# Patient Record
Sex: MALE | Race: WHITE | HISPANIC OR LATINO | Employment: UNEMPLOYED | URBAN - METROPOLITAN AREA
[De-identification: names, ages, dates, MRNs, and addresses within clinical notes are randomized per-mention and may not be internally consistent; named-entity substitution may affect disease eponyms.]

---

## 2023-04-15 ENCOUNTER — HOSPITAL ENCOUNTER (EMERGENCY)
Facility: CLINIC | Age: 33
Discharge: HOME | End: 2023-04-15
Attending: EMERGENCY MEDICINE | Admitting: EMERGENCY MEDICINE

## 2023-04-15 VITALS
RESPIRATION RATE: 20 BRPM | HEART RATE: 81 BPM | TEMPERATURE: 98.2 F | DIASTOLIC BLOOD PRESSURE: 78 MMHG | SYSTOLIC BLOOD PRESSURE: 132 MMHG | OXYGEN SATURATION: 98 %

## 2023-04-15 DIAGNOSIS — R42 DIZZINESS: ICD-10-CM

## 2023-04-15 LAB
ANION GAP SERPL CALCULATED.3IONS-SCNC: 11 MMOL/L (ref 7–15)
BASOPHILS # BLD AUTO: 0 10E3/UL (ref 0–0.2)
BASOPHILS NFR BLD AUTO: 0 %
BUN SERPL-MCNC: 13.3 MG/DL (ref 6–20)
CALCIUM SERPL-MCNC: 9.2 MG/DL (ref 8.6–10)
CHLORIDE SERPL-SCNC: 101 MMOL/L (ref 98–107)
CREAT SERPL-MCNC: 0.92 MG/DL (ref 0.67–1.17)
DEPRECATED HCO3 PLAS-SCNC: 26 MMOL/L (ref 22–29)
EOSINOPHIL # BLD AUTO: 0 10E3/UL (ref 0–0.7)
EOSINOPHIL NFR BLD AUTO: 1 %
ERYTHROCYTE [DISTWIDTH] IN BLOOD BY AUTOMATED COUNT: 12.9 % (ref 10–15)
GFR SERPL CREATININE-BSD FRML MDRD: >90 ML/MIN/1.73M2
GLUCOSE SERPL-MCNC: 117 MG/DL (ref 70–99)
HCT VFR BLD AUTO: 41.4 % (ref 40–53)
HGB BLD-MCNC: 14.1 G/DL (ref 13.3–17.7)
IMM GRANULOCYTES # BLD: 0 10E3/UL
IMM GRANULOCYTES NFR BLD: 1 %
LYMPHOCYTES # BLD AUTO: 1.9 10E3/UL (ref 0.8–5.3)
LYMPHOCYTES NFR BLD AUTO: 29 %
MCH RBC QN AUTO: 29 PG (ref 26.5–33)
MCHC RBC AUTO-ENTMCNC: 34.1 G/DL (ref 31.5–36.5)
MCV RBC AUTO: 85 FL (ref 78–100)
MONOCYTES # BLD AUTO: 0.4 10E3/UL (ref 0–1.3)
MONOCYTES NFR BLD AUTO: 7 %
NEUTROPHILS # BLD AUTO: 4.2 10E3/UL (ref 1.6–8.3)
NEUTROPHILS NFR BLD AUTO: 62 %
NRBC # BLD AUTO: 0 10E3/UL
NRBC BLD AUTO-RTO: 0 /100
PLATELET # BLD AUTO: 258 10E3/UL (ref 150–450)
POTASSIUM SERPL-SCNC: 3.7 MMOL/L (ref 3.4–5.3)
RBC # BLD AUTO: 4.86 10E6/UL (ref 4.4–5.9)
SODIUM SERPL-SCNC: 138 MMOL/L (ref 136–145)
TROPONIN T SERPL HS-MCNC: <6 NG/L
WBC # BLD AUTO: 6.6 10E3/UL (ref 4–11)

## 2023-04-15 PROCEDURE — 36415 COLL VENOUS BLD VENIPUNCTURE: CPT | Performed by: EMERGENCY MEDICINE

## 2023-04-15 PROCEDURE — 85014 HEMATOCRIT: CPT | Performed by: EMERGENCY MEDICINE

## 2023-04-15 PROCEDURE — 84484 ASSAY OF TROPONIN QUANT: CPT | Performed by: EMERGENCY MEDICINE

## 2023-04-15 PROCEDURE — 99284 EMERGENCY DEPT VISIT MOD MDM: CPT

## 2023-04-15 PROCEDURE — 93005 ELECTROCARDIOGRAM TRACING: CPT

## 2023-04-15 PROCEDURE — 80048 BASIC METABOLIC PNL TOTAL CA: CPT | Performed by: EMERGENCY MEDICINE

## 2023-04-15 ASSESSMENT — ACTIVITIES OF DAILY LIVING (ADL): ADLS_ACUITY_SCORE: 33

## 2023-04-15 ASSESSMENT — ENCOUNTER SYMPTOMS: DIZZINESS: 1

## 2023-04-15 NOTE — ED TRIAGE NOTES
While at work patient felt dizzy, vision became blurry, and felt like he was going to pass out. This occurred about 1415 when he was carrying heavy supplies to build a wall. Patient still feels dizzy with blurred vision. Was able to eat today prior. Had an episode 2 days ago was wasn't as intense and resolved quickly.

## 2023-04-15 NOTE — ED PROVIDER NOTES
History     Chief Complaint:  Dizziness       The history is provided by the patient. A  was used ([place]).      Donovan Martinez is a 32 year old male who presents with dizziness. The patient was at work around 1415 today when he began to feel like he was going to pass out with dizziness and blurred vision. The patient does work construction and carries heavy supplies and has been working a lot lately, including this past week with all the hotter weather. He has had 2 of these episodes and one at home when he was laying down watching a movie which lasted for not even a minute. He mentions that he was told that he previously was told he was having problems with his kidneys and only drinks one coffee a day.     Independent Historian:   None - Patient Only    Review of External Notes: none      ROS:  Review of Systems   Eyes: Positive for visual disturbance.   Neurological: Positive for dizziness.   All other systems reviewed and are negative.    Allergies:  The patient has no known allergies      Medications:    The patient is not taking any routine medications     Past Medical History:    The patient denies any past medical history     Social History:  Patient came from work.  Patient is unaccompanied in the ED.     PCP: No Ref-Primary, Physician     Physical Exam     Patient Vitals for the past 24 hrs:   BP Temp Pulse Resp SpO2   04/15/23 1518 (!) 147/86 98.2  F (36.8  C) 86 20 100 %        Physical Exam  Nursing note and vitals reviewed.  HENT:   Mouth/Throat: Moist mucous membranes.   Eyes: EOMI, nonicteric sclera  Cardiovascular: Normal rate, regular rhythm, no murmurs, rubs, or gallops  Pulmonary/Chest: Effort normal and breath sounds normal. No respiratory distress. No wheezes. No rales.   Abdominal: Soft. Nontender, nondistended, no guarding or rigidity.   Musculoskeletal: Normal range of motion.   Neurological: Alert. Moves all extremities spontaneously.   Skin: Skin is warm and  dry. No rash noted.       Emergency Department Course     Laboratory:  Labs Ordered and Resulted from Time of ED Arrival to Time of ED Departure   BASIC METABOLIC PANEL - Abnormal       Result Value    Sodium 138      Potassium 3.7      Chloride 101      Carbon Dioxide (CO2) 26      Anion Gap 11      Urea Nitrogen 13.3      Creatinine 0.92      Calcium 9.2      Glucose 117 (*)     GFR Estimate >90     TROPONIN T, HIGH SENSITIVITY - Normal    Troponin T, High Sensitivity <6     CBC WITH PLATELETS AND DIFFERENTIAL    WBC Count 6.6      RBC Count 4.86      Hemoglobin 14.1      Hematocrit 41.4      MCV 85      MCH 29.0      MCHC 34.1      RDW 12.9      Platelet Count 258      % Neutrophils 62      % Lymphocytes 29      % Monocytes 7      % Eosinophils 1      % Basophils 0      % Immature Granulocytes 1      NRBCs per 100 WBC 0      Absolute Neutrophils 4.2      Absolute Lymphocytes 1.9      Absolute Monocytes 0.4      Absolute Eosinophils 0.0      Absolute Basophils 0.0      Absolute Immature Granulocytes 0.0      Absolute NRBCs 0.0          Emergency Department Course & Assessments:     ECG results from 04/15/23   EKG 12 lead     Value    Systolic Blood Pressure     Diastolic Blood Pressure     Ventricular Rate 87    Atrial Rate 87    OK Interval 166    QRS Duration 108        QTc 433    P [place] 63    R AXIS 229    T Axis 36    Interpretation ECG      Sinus rhythm  Right superior axis deviation  Possible Right ventricular hypertrophy  Abnormal ECG  No previous ECGs available           Assessments:  1743 I consulted with the patient and obtained history as shown above    Independent Interpretation (X-rays, CTs, rhythm strip):  None    Consultations/Discussion of Management or Tests:  None     Social Determinants of Health affecting care:   None    Disposition:  The patient was discharged to home.     Impression & Plan      Medical Decision Making:  Patient presents with multiple episodes of dizziness.  These have  mostly occurred while patient has been working his construction job.  Notably, it has been 80 degrees for the first time this spring and patient reports he has been working multiple days straight.  While most likely etiology is dehydration/overexertion, also considered arrhythmia, anemia, renal failure, among other etiologies.  Patient's vital signs are normal.  Exam is normal.  Laboratory analysis is unremarkable.  EKG suggests potential right axis deviation, though I suspect this is more due to lead placement.  There are no concerning ST changes or evidence of underlying arrhythmia.  Reassured patient that no evidence of emergent etiology at this time.  Encourage primary care follow-up for ongoing symptoms as well as emergency department return for worsening complaints, loss of consciousness, or for any other concerns. He is in stable condition at the time of discharge, indications for return to the ED were discussed as well as follow up. All questions were answered and he is in agreement with the plan.        Diagnosis:    ICD-10-CM    1. Dizziness  R42            Scribe Disclosure:  I, Salvatore Smith, am serving as a scribe at 5:02 PM on 4/15/2023 to document services personally performed by Huang Mancini MD based on my observations and the provider's statements to me.             Huang Mancini MD  04/16/23 0581

## 2023-04-15 NOTE — ED NOTES
ED Triage Provider Note  [facility] EMERGENCY DEPT  Encounter Date: Apr 15, 2023    History:  Chief Complaint   Patient presents with     Dizziness     Donovan Martinez is a 32 year old male, otherwise healthy, who presents to the ED with dizziness.  The patient works in construction.  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  He was afraid he might lose consciousness.  He had a normal meal today.  The symptoms lasted less than a minute.  He denies headache, confusion, loss of consciousness.  At this time, he feels normal.  He had an episode 2 days ago with similar symptoms, also while performing heavy labor at work.      Review of Systems:  No headache    Exam:  BP (!) 147/86   Pulse 86   Temp 98.2  F (36.8  C)   Resp 20   SpO2 100%   General: No acute distress. Appears stated age.   Cardio: normal rate, extremities well perfused  Resp: Normal work of breathing, grossly normal respiratory rate  Neuro: Alert. Facial movement grossly symmetric. Grossly intact strength.  Normal gait.     Medical Decision Making:  Patient arriving to the ED with problem as above. A medical screening exam was performed.    Lab orders initiated from Triage. The patient is most appropriate to return to the waiting room.  I reviewed the patient's EKG.  No ST elevations or depressions, no arrhythmia.      Anjali Pimentel MD  4/15/2023 at 4:04 PM     Anjali Pimentel MD  04/15/23 5504

## 2023-04-17 LAB
ATRIAL RATE - MUSE: 87 BPM
DIASTOLIC BLOOD PRESSURE - MUSE: NORMAL MMHG
INTERPRETATION ECG - MUSE: NORMAL
P AXIS - MUSE: 63 DEGREES
PR INTERVAL - MUSE: 166 MS
QRS DURATION - MUSE: 108 MS
QT - MUSE: 360 MS
QTC - MUSE: 433 MS
R AXIS - MUSE: 229 DEGREES
SYSTOLIC BLOOD PRESSURE - MUSE: NORMAL MMHG
T AXIS - MUSE: 36 DEGREES
VENTRICULAR RATE- MUSE: 87 BPM

## 2023-08-01 ENCOUNTER — APPOINTMENT (OUTPATIENT)
Dept: INTERPRETER SERVICES | Facility: CLINIC | Age: 33
End: 2023-08-01